# Patient Record
Sex: FEMALE | URBAN - METROPOLITAN AREA
[De-identification: names, ages, dates, MRNs, and addresses within clinical notes are randomized per-mention and may not be internally consistent; named-entity substitution may affect disease eponyms.]

---

## 2017-11-06 ENCOUNTER — IMPORTED ENCOUNTER (OUTPATIENT)
Dept: URBAN - METROPOLITAN AREA CLINIC 31 | Facility: CLINIC | Age: 57
End: 2017-11-06

## 2017-11-06 PROBLEM — H11.151: Noted: 2017-11-06

## 2017-11-06 PROCEDURE — 92014 COMPRE OPH EXAM EST PT 1/>: CPT

## 2017-11-06 PROCEDURE — 92310 CONTACT LENS FITTING OU: CPT

## 2017-11-06 NOTE — PATIENT DISCUSSION
1.  Pinguecula OD --Reviewed that growth is caused by the cumulative effect of sun exposure over time and that it does not extend on to the cornea. Recommend UV protection to prevent progression and artificial tears prn for comfort. Return for continued monitoring. 2. Refractive error3. Continue present contact lens modality. Stop/wear and call if any redness pain or decrease in vision occur. 4.   No glasses change. 5.  Return for an appointment in 1 year for comprehensive exam. with Dr. Keysha Good.

## 2022-04-02 ASSESSMENT — TONOMETRY
OD_IOP_MMHG: 15
OS_IOP_MMHG: 15

## 2022-04-02 ASSESSMENT — VISUAL ACUITY
OD_CC: 20/40
OD_SC: 20/20-2
OS_CC: 20/20
OS_SC: 20/50-2